# Patient Record
(demographics unavailable — no encounter records)

---

## 2025-01-17 NOTE — HISTORY OF PRESENT ILLNESS
[de-identified] : 60 y/o female with pmhx of htn, migraines, arthritis, hld, presents for follow up. At last visit patient was started on amlodipine for HTN and atorvastatin for HLD. States she is taking both medications as prescribed and tolerating them well without any adverse effects  Patient has had episodes of room spinning with nausea. Denies any headache. She has been feeling more off balance recently as well. Denies any numbness/tingling or weakness in the extremities. Does have tinnitus as well. Denies hearing issues at this time, however has previously mentioned them

## 2025-01-17 NOTE — ASSESSMENT
[FreeTextEntry1] : HTN bp now stable, will continue amlodipine 5mg daily  HLD continue atorvastatin 20mg daily will check lipid panel today  Vertigo will treat with meclizine 12.5mg bid as needed discussed possibly going for vestibular therapy will refer to ENT for evaluation in setting of tinnitus

## 2025-01-29 NOTE — PHYSICAL EXAM
[Nystagmus] : ~T no ~M nystagmus was seen [Fistula Sign] : Fistula Sign: Negative [Anne-Hallalexiske] : Bailey-Hallpike: Negative [Midline] : trachea located in midline position [Normal] : no neck adenopathy

## 2025-01-29 NOTE — ASSESSMENT
[FreeTextEntry1] : History consistent with BPPV, however DH negative.  Explained disease and course to patient.  Check Audiogram. Seek attention for otalgia, otorrhea, bleeding, headache, hearing loss, dizziness or vertigo. F/u PRN

## 2025-01-29 NOTE — CONSULT LETTER
[Dear  ___] : Dear  [unfilled], [Courtesy Letter:] : I had the pleasure of seeing your patient, [unfilled], in my office today. [Please see my note below.] : Please see my note below. [Sincerely,] : Sincerely, [FreeTextEntry2] : Dr. Quintin Maddox  [FreeTextEntry3] : Selvin Mcbride MD Otolaryngology at [address], [address] [place] [address] Phone: [phone] Fax: [phone]  (4) no impairment

## 2025-05-20 NOTE — ASSESSMENT
[FreeTextEntry1] : HTN bp now stable, will continue amlodipine 5mg daily  HLD continue atorvastatin 20mg daily most recent lipid panel stable  Dry skin advised to increase oral hydration will refer to derm  Fecal incontinence will refer to colorectal for evaluation

## 2025-05-20 NOTE — HISTORY OF PRESENT ILLNESS
[FreeTextEntry1] : follow up of chronic medical conditions [de-identified] : 63 y/o female with pmhx of htn, migraines, arthritis, hld, presents for follow up. Patient has been overall doing well. She is currently on amlodipine and atorvastatin. She is concerned with dry skin on her hands and lips. She only drinks about 24 oz of water a day. She was seen by a dermatologist but was unable with them. She has also been episodes of fecal incontinence since her surgery for vaginal prolapse. States she will feel abdominal discomfort prior to these episodes

## 2025-07-16 NOTE — HISTORY OF PRESENT ILLNESS
Medication passed protocol.     Medication: NIFEDIPINE passed protocol.   Last office visit date: 03/11/2025  Next appointment scheduled?: Yes      [de-identified] : 61 year old female here for dizziness with nausea for 2 months with intermittent tinnitus, otalgia. Patient denies otorrhea, ear infections, hearing loss. Pt was prescribed meclizine by Dr. Maddox, has not used yet. Had 2 episodes like this with vertigo lasting ~1m after turning out of bed.  Hasn't had any further.